# Patient Record
Sex: MALE | Race: WHITE | NOT HISPANIC OR LATINO | Employment: PART TIME | URBAN - METROPOLITAN AREA
[De-identification: names, ages, dates, MRNs, and addresses within clinical notes are randomized per-mention and may not be internally consistent; named-entity substitution may affect disease eponyms.]

---

## 2020-10-14 RX ORDER — PROCHLORPERAZINE 25 MG/1
SUPPOSITORY RECTAL
Qty: 3 EACH | Refills: 3 | Status: SHIPPED | OUTPATIENT
Start: 2020-10-14 | End: 2020-10-28 | Stop reason: SDUPTHER

## 2020-10-14 RX ORDER — PROCHLORPERAZINE 25 MG/1
1 SUPPOSITORY RECTAL TAKE AS DIRECTED
Qty: 1 EACH | Refills: 0 | Status: SHIPPED | OUTPATIENT
Start: 2020-10-14 | End: 2020-10-28 | Stop reason: SDUPTHER

## 2020-10-14 RX ORDER — PROCHLORPERAZINE 25 MG/1
1 SUPPOSITORY RECTAL
Qty: 1 DEVICE | Refills: 1 | Status: SHIPPED | OUTPATIENT
Start: 2020-10-14 | End: 2020-10-28 | Stop reason: SDUPTHER

## 2020-10-14 NOTE — TELEPHONE ENCOUNTER
PATIENT'S SPOUSE CALLED STATING THAT PATIENT 'PASSED OUT' LAST NIGHT. SHE STATES THAT THIS EPISODE LASTED FOR ABOUT 30 MINUTES. SHE STATES THAT HE HAS BEEN TRYING TO GET A JULIAN SYSTEM APPROVAL FOR ABOUT ONE YEAR AND WOULDS LIKE TO KNOW IF THIS OFICE CAN TRY AGAIN TO GET THIS APPROVAL.    CALL BACK [phone] SPOUSE DENIS

## 2020-10-14 NOTE — TELEPHONE ENCOUNTER
I think a dexcom, which alerts him to low blood sugar would be more appropriate. If they agree, I can send scripts for that to their pharmacy. I just need their pharmacy  Info entered into the computer

## 2020-10-15 NOTE — TELEPHONE ENCOUNTER
PT's insurance is not covering his Dexcom G6 Rx due to the need of Pre-authorization from PCP. He ask that someone reach out to him

## 2020-10-20 RX ORDER — PROCHLORPERAZINE 25 MG/1
1 SUPPOSITORY RECTAL
Qty: 1 EACH | Refills: 3 | Status: SHIPPED | OUTPATIENT
Start: 2020-10-20 | End: 2020-12-11 | Stop reason: ALTCHOICE

## 2020-10-20 RX ORDER — PROCHLORPERAZINE 25 MG/1
1 SUPPOSITORY RECTAL
Qty: 1 DEVICE | Refills: 0 | Status: SHIPPED | OUTPATIENT
Start: 2020-10-20 | End: 2020-12-11 | Stop reason: ALTCHOICE

## 2020-10-20 RX ORDER — PROCHLORPERAZINE 25 MG/1
SUPPOSITORY RECTAL
Qty: 3 EACH | Refills: 11 | Status: SHIPPED | OUTPATIENT
Start: 2020-10-20 | End: 2020-12-11 | Stop reason: ALTCHOICE

## 2020-10-20 NOTE — TELEPHONE ENCOUNTER
I spoke to pt.  He doesn't have a dexcom but wants one.  His last note in centricity states he is checking 4 times a day and he takes 4 shots of insulin aday.  His last vist was 6/5/20 but he doesn't have an appt scheduled.  He had a low bs recently that scared him and his wife.  Ems was called

## 2020-10-28 ENCOUNTER — TELEPHONE (OUTPATIENT)
Dept: ENDOCRINOLOGY | Facility: CLINIC | Age: 50
End: 2020-10-28

## 2020-10-28 RX ORDER — PROCHLORPERAZINE 25 MG/1
SUPPOSITORY RECTAL
Qty: 3 EACH | Refills: 5 | Status: SHIPPED | OUTPATIENT
Start: 2020-10-28 | End: 2020-12-11 | Stop reason: ALTCHOICE

## 2020-10-28 RX ORDER — PROCHLORPERAZINE 25 MG/1
1 SUPPOSITORY RECTAL
Qty: 1 DEVICE | Refills: 0 | Status: SHIPPED | OUTPATIENT
Start: 2020-10-28 | End: 2020-12-11 | Stop reason: ALTCHOICE

## 2020-10-28 RX ORDER — PROCHLORPERAZINE 25 MG/1
1 SUPPOSITORY RECTAL TAKE AS DIRECTED
Qty: 1 EACH | Refills: 3 | Status: SHIPPED | OUTPATIENT
Start: 2020-10-28 | End: 2020-12-11 | Stop reason: ALTCHOICE

## 2020-10-28 NOTE — TELEPHONE ENCOUNTER
PLEASE CALL PT HE WAS CALLING ABOUT THE PA FOR HIS DEXCOM    PLEASE CALL [phone] IF HE DOES NOT ANSWER ITS OK TO LEAVE A MESSAGE

## 2020-12-11 ENCOUNTER — OFFICE VISIT (OUTPATIENT)
Dept: ENDOCRINOLOGY | Facility: CLINIC | Age: 50
End: 2020-12-11

## 2020-12-11 VITALS
HEIGHT: 67 IN | WEIGHT: 240 LBS | DIASTOLIC BLOOD PRESSURE: 84 MMHG | SYSTOLIC BLOOD PRESSURE: 132 MMHG | BODY MASS INDEX: 37.67 KG/M2 | HEART RATE: 84 BPM

## 2020-12-11 DIAGNOSIS — I10 ESSENTIAL HYPERTENSION: ICD-10-CM

## 2020-12-11 DIAGNOSIS — E10.649 TYPE 1 DIABETES MELLITUS WITH HYPOGLYCEMIA AND WITHOUT COMA (HCC): Primary | ICD-10-CM

## 2020-12-11 PROCEDURE — 99213 OFFICE O/P EST LOW 20 MIN: CPT | Performed by: INTERNAL MEDICINE

## 2020-12-11 RX ORDER — ALBUTEROL SULFATE 1.25 MG/3ML
1 SOLUTION RESPIRATORY (INHALATION) EVERY 6 HOURS PRN
COMMUNITY

## 2020-12-11 RX ORDER — MONTELUKAST SODIUM 10 MG/1
10 TABLET ORAL DAILY
COMMUNITY
Start: 2020-10-22

## 2020-12-11 RX ORDER — MULTIPLE VITAMINS W/ MINERALS TAB 9MG-400MCG
1 TAB ORAL DAILY
COMMUNITY

## 2020-12-11 RX ORDER — INSULIN ASPART 100 [IU]/ML
INJECTION, SOLUTION INTRAVENOUS; SUBCUTANEOUS
COMMUNITY
Start: 2020-09-11 | End: 2020-12-11 | Stop reason: SDUPTHER

## 2020-12-11 RX ORDER — LANOLIN ALCOHOL/MO/W.PET/CERES
1000 CREAM (GRAM) TOPICAL DAILY
COMMUNITY

## 2020-12-11 RX ORDER — LANSOPRAZOLE 15 MG/1
15 CAPSULE, DELAYED RELEASE ORAL DAILY
COMMUNITY

## 2020-12-11 RX ORDER — ATORVASTATIN CALCIUM 40 MG/1
40 TABLET, FILM COATED ORAL DAILY
COMMUNITY

## 2020-12-11 RX ORDER — LANCETS
EACH MISCELLANEOUS
COMMUNITY
Start: 2020-09-09 | End: 2020-12-15 | Stop reason: SDUPTHER

## 2020-12-11 RX ORDER — BUDESONIDE AND FORMOTEROL FUMARATE DIHYDRATE 160; 4.5 UG/1; UG/1
2 AEROSOL RESPIRATORY (INHALATION)
COMMUNITY

## 2020-12-11 RX ORDER — INSULIN ASPART 100 [IU]/ML
INJECTION, SOLUTION INTRAVENOUS; SUBCUTANEOUS
Qty: 216 ML | Refills: 3 | Status: SHIPPED | OUTPATIENT
Start: 2020-12-11 | End: 2021-01-14 | Stop reason: SDUPTHER

## 2020-12-11 RX ORDER — LOSARTAN POTASSIUM 100 MG/1
100 TABLET ORAL EVERY MORNING
COMMUNITY
Start: 2020-10-22

## 2020-12-11 RX ORDER — BLOOD SUGAR DIAGNOSTIC
1 STRIP MISCELLANEOUS 4 TIMES DAILY
Qty: 400 EACH | Refills: 3 | Status: SHIPPED | OUTPATIENT
Start: 2020-12-11 | End: 2020-12-15 | Stop reason: SDUPTHER

## 2020-12-11 RX ORDER — HYDROCHLOROTHIAZIDE 25 MG/1
25 TABLET ORAL DAILY
COMMUNITY
Start: 2020-10-22

## 2020-12-11 RX ORDER — ASPIRIN 325 MG
325 TABLET ORAL DAILY
COMMUNITY

## 2020-12-11 RX ORDER — UBIDECARENONE 100 MG
200 CAPSULE ORAL DAILY
COMMUNITY
End: 2023-01-06

## 2020-12-11 RX ORDER — BLOOD SUGAR DIAGNOSTIC
1 STRIP MISCELLANEOUS 4 TIMES DAILY
COMMUNITY
Start: 2020-09-08 | End: 2020-12-11 | Stop reason: SDUPTHER

## 2020-12-11 NOTE — PROGRESS NOTES
"     Office Note      Date: 2020  Patient Name: Keo Kang  MRN: [id number]  : 1970    Chief Complaint   Patient presents with   • Diabetes       History of Present Illness:   Keo Kang is a 50 y.o. male who presents for Diabetes - type 1  Duration- 30 + years  Severity- easily controlled  Associated conditions- high cholesterol and high blood pressure - on meds for both.  Symptoms- none.    bg checks- 4 times per day  Hypoglycemia- occasionally .. some rather symptomatic      Last A1c:7.6  Changes in health since last visit: none . Last eye exam up to date.    Subjective            Review of Systems:   Review of Systems   Constitutional: Negative.    HENT: Negative.    Eyes: Negative.    Respiratory: Negative.    Cardiovascular: Negative.    Gastrointestinal: Negative.    Endocrine: Negative.    Genitourinary: Negative.    Musculoskeletal: Negative.    Skin: Negative.    Allergic/Immunologic: Negative.    Neurological: Negative.    Hematological: Negative.    Psychiatric/Behavioral: Negative.        The following portions of the patient's history were reviewed and updated as appropriate: allergies, current medications, past family history, past medical history, past social history, past surgical history and problem list.    Objective     Visit Vitals  /84 (BP Location: Left arm, Patient Position: Sitting, Cuff Size: Adult)   Pulse 84   Ht 170.2 cm (67\")   Wt 109 kg (240 lb)   BMI 37.59 kg/m²       Labs:    CMP  No results found for: GLUCOSE, BUN, CREATININE, EGFRIFNONA, EGFRIFAFRI, BCR, K, CO2, CALCIUM, PROTENTOTREF, LABIL2, BILIRUBIN, AST, ALT     CBC w/DIFF  No results found for: WBC, RBC, HGB, HCT, MCV, MCH, MCHC, RDW, RDWSD, MPV, PLT, NEUTRORELPCT, LYMPHORELPCT, MONORELPCT, EOSRELPCT, BASORELPCT, AUTOIGPER, NEUTROABS, LYMPHSABS, MONOSABS, EOSABS, BASOSABS, AUTOIGNUM, NRBC    Physical Exam:  Physical Exam     Assessment / Plan      Assessment & Plan:  Problem List Items Addressed " This Visit        Cardiovascular and Mediastinum    Essential hypertension    Current Assessment & Plan     bp a little high today         Relevant Medications    hydroCHLOROthiazide (HYDRODIURIL) 25 MG tablet    losartan (COZAAR) 100 MG tablet       Endocrine    Type 1 diabetes mellitus with hypoglycemia (CMS/[facility]) - Primary    Current Assessment & Plan     a1c is improved. No changes from my end          Relevant Medications    NovoLOG FlexPen 100 UNIT/ML solution pen-injector sc pen    Insulin Degludec (TRESIBA FLEXTOUCH) 200 UNIT/ML solution pen-injector pen injection           Dante Lou MD   12/11/2020

## 2020-12-15 DIAGNOSIS — E10.649 TYPE 1 DIABETES MELLITUS WITH HYPOGLYCEMIA AND WITHOUT COMA (HCC): ICD-10-CM

## 2020-12-15 RX ORDER — BLOOD SUGAR DIAGNOSTIC
1 STRIP MISCELLANEOUS 4 TIMES DAILY
Qty: 400 EACH | Refills: 3 | Status: SHIPPED | OUTPATIENT
Start: 2020-12-15 | End: 2021-10-05

## 2020-12-15 RX ORDER — LANCETS
EACH MISCELLANEOUS
Qty: 400 EACH | Refills: 3 | Status: SHIPPED | OUTPATIENT
Start: 2020-12-15 | End: 2021-10-04

## 2021-01-14 DIAGNOSIS — E10.649 TYPE 1 DIABETES MELLITUS WITH HYPOGLYCEMIA AND WITHOUT COMA (HCC): ICD-10-CM

## 2021-01-14 RX ORDER — PEN NEEDLE, DIABETIC 30 GX3/16"
1 NEEDLE, DISPOSABLE MISCELLANEOUS
Qty: 540 EACH | Refills: 1 | Status: SHIPPED | OUTPATIENT
Start: 2021-01-14

## 2021-01-14 RX ORDER — INSULIN ASPART 100 [IU]/ML
INJECTION, SOLUTION INTRAVENOUS; SUBCUTANEOUS
Qty: 216 ML | Refills: 3 | Status: SHIPPED | OUTPATIENT
Start: 2021-01-14 | End: 2022-01-19

## 2021-01-14 NOTE — TELEPHONE ENCOUNTER
Spoke with patient.  Requesting refill through [facility]]a.

## 2021-01-14 NOTE — TELEPHONE ENCOUNTER
Pt called inquiring if his medications Nuvolog and pen needles have been accepted for [facility] online [facility]. CB number is [phone] or call home phone.

## 2021-01-21 ENCOUNTER — TELEPHONE (OUTPATIENT)
Dept: ENDOCRINOLOGY | Facility: CLINIC | Age: 51
End: 2021-01-21

## 2021-05-24 NOTE — TELEPHONE ENCOUNTER
Message from Plan  PA Case: [id number], Status: Approved, Coverage Starts on: 1/1/2021 12:00:00 AM, Coverage Ends on: 12/31/2021   no

## 2021-06-11 ENCOUNTER — OFFICE VISIT (OUTPATIENT)
Dept: ENDOCRINOLOGY | Facility: CLINIC | Age: 51
End: 2021-06-11

## 2021-06-11 VITALS
BODY MASS INDEX: 36.88 KG/M2 | WEIGHT: 235 LBS | HEIGHT: 67 IN | HEART RATE: 70 BPM | SYSTOLIC BLOOD PRESSURE: 128 MMHG | DIASTOLIC BLOOD PRESSURE: 80 MMHG | OXYGEN SATURATION: 98 %

## 2021-06-11 DIAGNOSIS — IMO0002 DIABETES MELLITUS TYPE 1, UNCONTROLLED, WITH COMPLICATIONS: Primary | ICD-10-CM

## 2021-06-11 DIAGNOSIS — E10.649 TYPE 1 DIABETES MELLITUS WITH HYPOGLYCEMIA AND WITHOUT COMA (HCC): ICD-10-CM

## 2021-06-11 LAB
EXPIRATION DATE: NORMAL
GLUCOSE BLDC GLUCOMTR-MCNC: 97 MG/DL (ref 70–130)
Lab: NORMAL

## 2021-06-11 PROCEDURE — 99213 OFFICE O/P EST LOW 20 MIN: CPT | Performed by: INTERNAL MEDICINE

## 2021-06-11 PROCEDURE — 82947 ASSAY GLUCOSE BLOOD QUANT: CPT | Performed by: INTERNAL MEDICINE

## 2021-06-11 NOTE — PROGRESS NOTES
"     Office Note      Date: 2021  Patient Name: Keo Kang  MRN: [id number]  : 1970    Chief Complaint   Patient presents with   • Diabetes       History of Present Illness:   Keo Kang is a 50 y.o. male who presents for Diabetes- type 1  He is on tresiba and novolog.  bg checks are done 4 times per day  Hypoglycemia- weekly.. as low as 23. Wife had to give glucagon but that's a rarity       Last A1c:7.5      Changes in health since last visit: none / has not had his covid vaccine . Last eye exam   Full labs are up to date  Foot exam was done in may.    Subjective        Review of Systems:   Review of Systems   Constitutional: Negative.    HENT: Negative.    Eyes: Negative.    Respiratory: Negative.    All other systems reviewed and are negative.      The following portions of the patient's history were reviewed and updated as appropriate: allergies, current medications, past family history, past medical history, past social history, past surgical history and problem list.    Objective     Visit Vitals  /80 (BP Location: Left arm, Patient Position: Sitting, Cuff Size: Adult)   Pulse 70   Ht 170.2 cm (67\")   Wt 107 kg (235 lb)   SpO2 98%   BMI 36.81 kg/m²       Labs:        Physical Exam:  Physical Exam  Vitals reviewed.   Constitutional:       Appearance: Normal appearance.   Neurological:      Mental Status: He is alert.   Psychiatric:         Mood and Affect: Mood normal.         Thought Content: Thought content normal.         Judgment: Judgment normal.          Assessment / Plan      Assessment & Plan:  Problem List Items Addressed This Visit        Other    Type 1 diabetes mellitus with hypoglycemia (CMS/[facility])    Current Assessment & Plan     Diabetes is unchanged.   Continue current treatment regimen.  Diabetes will be reassessed in 6 months.         Relevant Medications    Accu-Chek SmartView test strip    NovoLOG FlexPen 100 UNIT/ML solution pen-injector sc pen    Insulin " Degludec (TRESIBA FLEXTOUCH) 200 UNIT/ML solution pen-injector pen injection      Other Visit Diagnoses     Diabetes mellitus type 1, uncontrolled, with complications (CMS/[facility])    -  Primary    Relevant Orders    POC Glucose, Blood           Dante Lou MD   06/11/2021

## 2021-09-07 RX ORDER — INSULIN DEGLUDEC 200 U/ML
INJECTION, SOLUTION SUBCUTANEOUS
Qty: 45 ML | Refills: 1 | Status: SHIPPED | OUTPATIENT
Start: 2021-09-07 | End: 2022-05-12

## 2021-10-04 RX ORDER — LANCETS
EACH MISCELLANEOUS
Qty: 408 EACH | Refills: 1 | Status: SHIPPED | OUTPATIENT
Start: 2021-10-04 | End: 2022-03-02

## 2021-10-05 DIAGNOSIS — E10.649 TYPE 1 DIABETES MELLITUS WITH HYPOGLYCEMIA AND WITHOUT COMA (HCC): ICD-10-CM

## 2021-10-05 RX ORDER — BLOOD SUGAR DIAGNOSTIC
STRIP MISCELLANEOUS
Qty: 400 EACH | Refills: 1 | Status: SHIPPED | OUTPATIENT
Start: 2021-10-05 | End: 2022-03-09 | Stop reason: SDUPTHER

## 2021-12-17 ENCOUNTER — OFFICE VISIT (OUTPATIENT)
Dept: ENDOCRINOLOGY | Facility: CLINIC | Age: 51
End: 2021-12-17

## 2021-12-17 VITALS
BODY MASS INDEX: 36.73 KG/M2 | DIASTOLIC BLOOD PRESSURE: 72 MMHG | HEART RATE: 82 BPM | HEIGHT: 67 IN | WEIGHT: 234 LBS | OXYGEN SATURATION: 96 % | SYSTOLIC BLOOD PRESSURE: 110 MMHG

## 2021-12-17 DIAGNOSIS — E10.649 TYPE 1 DIABETES MELLITUS WITH HYPOGLYCEMIA AND WITHOUT COMA (HCC): Primary | ICD-10-CM

## 2021-12-17 LAB — HBA1C MFR BLD: 7.8 %

## 2021-12-17 PROCEDURE — 99213 OFFICE O/P EST LOW 20 MIN: CPT | Performed by: INTERNAL MEDICINE

## 2021-12-17 RX ORDER — METHOCARBAMOL 500 MG/1
500 TABLET, FILM COATED ORAL AS NEEDED
COMMUNITY

## 2022-01-19 ENCOUNTER — TELEPHONE (OUTPATIENT)
Dept: ENDOCRINOLOGY | Facility: CLINIC | Age: 52
End: 2022-01-19

## 2022-01-19 RX ORDER — INSULIN LISPRO 100 [IU]/ML
INJECTION, SOLUTION INTRAVENOUS; SUBCUTANEOUS
Qty: 72 ML | Refills: 5 | Status: SHIPPED | OUTPATIENT
Start: 2022-01-19 | End: 2022-06-20 | Stop reason: SDUPTHER

## 2022-01-19 NOTE — TELEPHONE ENCOUNTER
PT CALLED STATING NOVOLOG REQUIRES A PA AGAIN. PT REQUESTED WE LOOK INTO THIS AND REACH OUT HIM. THANK YOU

## 2022-01-20 DIAGNOSIS — E10.649 TYPE 1 DIABETES MELLITUS WITH HYPOGLYCEMIA AND WITHOUT COMA: ICD-10-CM

## 2022-01-20 RX ORDER — INSULIN ASPART 100 [IU]/ML
INJECTION, SOLUTION INTRAVENOUS; SUBCUTANEOUS
Qty: 210 ML | Refills: 5 | Status: SHIPPED | OUTPATIENT
Start: 2022-01-20 | End: 2023-01-25 | Stop reason: SDUPTHER

## 2022-01-21 ENCOUNTER — TELEPHONE (OUTPATIENT)
Dept: ENDOCRINOLOGY | Facility: CLINIC | Age: 52
End: 2022-01-21

## 2022-01-21 NOTE — TELEPHONE ENCOUNTER
PLEASE CALL PT REGARDING HIS PA FOR NOVOLOG    HE SPOKE WITH HIS INSURANCE COMPANY AND THEY TOLD HIM TO JUST GET IT APPROVED     PLEASE CALL  [phone] OR [phone]

## 2022-03-02 RX ORDER — LANCETS
EACH MISCELLANEOUS
Qty: 408 EACH | Refills: 3 | Status: SHIPPED | OUTPATIENT
Start: 2022-03-02 | End: 2022-10-17

## 2022-03-09 DIAGNOSIS — E10.649 TYPE 1 DIABETES MELLITUS WITH HYPOGLYCEMIA AND WITHOUT COMA: ICD-10-CM

## 2022-03-09 RX ORDER — BLOOD SUGAR DIAGNOSTIC
STRIP MISCELLANEOUS
Qty: 400 EACH | Refills: 1 | Status: SHIPPED | OUTPATIENT
Start: 2022-03-09 | End: 2022-08-03

## 2022-05-12 RX ORDER — INSULIN DEGLUDEC 200 U/ML
INJECTION, SOLUTION SUBCUTANEOUS
Qty: 45 ML | Refills: 0 | Status: SHIPPED | OUTPATIENT
Start: 2022-05-12 | End: 2023-02-09

## 2022-06-20 ENCOUNTER — OFFICE VISIT (OUTPATIENT)
Dept: ENDOCRINOLOGY | Facility: CLINIC | Age: 52
End: 2022-06-20

## 2022-06-20 VITALS
WEIGHT: 228 LBS | HEART RATE: 57 BPM | HEIGHT: 67 IN | OXYGEN SATURATION: 98 % | BODY MASS INDEX: 35.79 KG/M2 | SYSTOLIC BLOOD PRESSURE: 120 MMHG | DIASTOLIC BLOOD PRESSURE: 74 MMHG

## 2022-06-20 DIAGNOSIS — I10 ESSENTIAL HYPERTENSION: ICD-10-CM

## 2022-06-20 DIAGNOSIS — E78.2 MIXED HYPERLIPIDEMIA: ICD-10-CM

## 2022-06-20 DIAGNOSIS — E10.649 TYPE 1 DIABETES MELLITUS WITH HYPOGLYCEMIA AND WITHOUT COMA: Primary | ICD-10-CM

## 2022-06-20 PROCEDURE — 82570 ASSAY OF URINE CREATININE: CPT | Performed by: PHYSICIAN ASSISTANT

## 2022-06-20 PROCEDURE — 99214 OFFICE O/P EST MOD 30 MIN: CPT | Performed by: PHYSICIAN ASSISTANT

## 2022-06-20 PROCEDURE — 82043 UR ALBUMIN QUANTITATIVE: CPT | Performed by: PHYSICIAN ASSISTANT

## 2022-06-20 NOTE — PROGRESS NOTES
Office Note      Date: 2022  Patient Name: Keo Kang  MRN: [id number]  : 1970    Chief Complaint   Patient presents with   • Diabetes       History of Present Illness:   Keo Kang is a 51 y.o. male who presents for 6 month follow-up for type 1 diabetes.  He had labs completed with his primary care physician the end of last month.  His hemoglobin A1c was 7.3%.  He remains on Tresiba up to 98 units daily but recently has been taking 84 to 88 units because he had some issues with severe hypoglycemia about 6 weeks ago.  He reports his blood glucose dropped into the 20s and his wife had trouble helping him to get this back up but EMS was not called.  He reports he reduced the insulin after the incident and has not had any more hypoglycemia that severe.  He reports his primary care physician gave him a sample of the freestyle wei 2 to try.  Dr. Lou has suggested continuous glucose monitoring in the past but this was too costly.  He checks his blood sugar 4 times a day.  Patient reports he is interested in considering this again.  He has lost weight since his appointment in December.  He reports he and his wife have been trying to eat healthier in an effort to lose weight.  He reports he went for his eye exam last Monday and all was okay.  He denies any trouble with his feet today Dr. Lou did his foot exam at his appointment in December.      Subjective     Review of Systems:   Review of Systems   Constitutional: Negative.    Cardiovascular: Negative.    Gastrointestinal: Negative.    Endocrine: Negative.    Neurological: Negative.        The following portions of the patient's history were reviewed and updated as appropriate: allergies, current medications, past family history, past medical history, past social history, past surgical history and problem list.    Objective     Vitals:    22 0847   BP: 120/74   Pulse: 57   SpO2: 98%   Weight: 103 kg (228 lb)   Height: 170.2 cm  "(67\")   PainSc:   4     Body mass index is 35.71 kg/m².    Physical Exam  Vitals reviewed.   Constitutional:       General: He is not in acute distress.     Appearance: Normal appearance.   Neurological:      Mental Status: He is alert.           Assessment / Plan      Assessment & Plan:  1. Type 1 diabetes mellitus with hypoglycemia and without coma (HCC)  I reviewed his lab results from May 27 of this year.  His hemoglobin A1c was 7.3%.  This is improved as compared to December when this was checked but is still above goal.  He has had some trouble with hypoglycemia about 6 weeks ago.  He reduced his Tresiba and has not had any severe hypoglycemia since.  For now he will continue 84 units of Tresiba daily and NovoLog with meals based on his blood glucose reading and carbohydrate intake.  I think you would benefit from a continuous glucose monitor.  His primary care physician sampled him the freestyle wei 2.  I sent a prescription for the sensors to [facility] pharmacy to see if this will be covered and is may be more affordable than it was in the past.  Previously the Dexcom was covered but too costly and was sent through [facility].  I encouraged patient to reach out if we need to send the sensor prescription elsewhere.  His weight is down 6 pounds since his appointment in December.  I encouraged continued healthy eating habits and physical activity as tolerated.  He is due for his urine microalbumin/creatinine ratio.  We will do this today.  Will send note with results and plan.  - Microalbumin / Creatinine Urine Ratio - Urine, Clean Catch; Future  - Microalbumin / Creatinine Urine Ratio - Urine, Clean Catch    2. Essential hypertension  His blood pressure is okay today.  He will continue his current medications.    3. Mixed hyperlipidemia  His fasting lipid panel was to goal last month.  I reviewed these results.  He will continue atorvastatin 40 mg daily.  Patient to call as needed.      Return in about 6 " months (around 12/20/2022) for sees Dr. Dante Lou every 6 mos.     This note was dictated using Dragon voice recognition.    Madeline Kaba PA-C  06/20/2022

## 2022-06-21 LAB
ALBUMIN/CREAT UR: <9 MG/G CREAT (ref 0–29)
CREAT UR-MCNC: 34 MG/DL
MICROALBUMIN UR-MCNC: <3 UG/ML

## 2022-06-29 ENCOUNTER — TELEPHONE (OUTPATIENT)
Dept: ENDOCRINOLOGY | Facility: CLINIC | Age: 52
End: 2022-06-29

## 2022-06-29 NOTE — TELEPHONE ENCOUNTER
Pt called he's following up Jean 2 sensors?  Waiting on the PA from the 21st to process?  Please contact.  Last time they got it to approve it had Durable [employer]........Thank you

## 2022-06-29 NOTE — TELEPHONE ENCOUNTER
Spoke with patient.  He is going to contact insurance and find out if this needs to go through pharmacy or DME and call the office back.

## 2022-06-29 NOTE — TELEPHONE ENCOUNTER
PT CALLED STATING HE CXLED HIS ORDER FROM PHARM. HE WAS UNSURE IF THEY WOULD STILL SEND DENIAL. HE REQUESTED WE LOOK INTO THIS OR SEND RX IN TO [facility] AND CONTACT PT.

## 2022-07-19 ENCOUNTER — TELEPHONE (OUTPATIENT)
Dept: ENDOCRINOLOGY | Facility: CLINIC | Age: 52
End: 2022-07-19

## 2022-08-03 DIAGNOSIS — E10.649 TYPE 1 DIABETES MELLITUS WITH HYPOGLYCEMIA AND WITHOUT COMA: ICD-10-CM

## 2022-08-03 RX ORDER — BLOOD SUGAR DIAGNOSTIC
STRIP MISCELLANEOUS
Qty: 400 EACH | Refills: 1 | Status: SHIPPED | OUTPATIENT
Start: 2022-08-03 | End: 2023-03-13

## 2022-10-17 RX ORDER — LANCETS
EACH MISCELLANEOUS
Qty: 400 EACH | Refills: 3 | Status: SHIPPED | OUTPATIENT
Start: 2022-10-17

## 2023-01-06 ENCOUNTER — OFFICE VISIT (OUTPATIENT)
Dept: ENDOCRINOLOGY | Facility: CLINIC | Age: 53
End: 2023-01-06
Payer: MEDICARE

## 2023-01-06 VITALS
WEIGHT: 203 LBS | HEART RATE: 60 BPM | SYSTOLIC BLOOD PRESSURE: 114 MMHG | BODY MASS INDEX: 31.86 KG/M2 | HEIGHT: 67 IN | OXYGEN SATURATION: 97 % | DIASTOLIC BLOOD PRESSURE: 73 MMHG

## 2023-01-06 DIAGNOSIS — E10.649 TYPE 1 DIABETES MELLITUS WITH HYPOGLYCEMIA AND WITHOUT COMA: Primary | ICD-10-CM

## 2023-01-06 LAB
EXPIRATION DATE: ABNORMAL
GLUCOSE BLDC GLUCOMTR-MCNC: 106 MG/DL (ref 70–130)
Lab: ABNORMAL

## 2023-01-06 PROCEDURE — 82947 ASSAY GLUCOSE BLOOD QUANT: CPT | Performed by: INTERNAL MEDICINE

## 2023-01-06 PROCEDURE — 99213 OFFICE O/P EST LOW 20 MIN: CPT | Performed by: INTERNAL MEDICINE

## 2023-01-06 NOTE — PROGRESS NOTES
Office Note      Date: 2023  Patient Name: Keo Kang  MRN: [id number]  : 1970    Chief Complaint   Patient presents with   • Diabetes       History of Present Illness:   Keo Kang is a 52 y.o. male who presents for Diabetes type 1.   Current RX  4 shots per day     Bg checks are done:>10 times per day with wei   Hypoglycemia :occasional       Last A1c:7.4 in December with FPA  Hemoglobin A1C   Date Value Ref Range Status   2021 7.8  Final       Changes in health since last visit: had full labs in December with pcp . Last eye exam  .    Subjective              Review of Systems:   Review of Systems   Constitutional: Negative.    HENT: Negative.    Eyes: Negative.    Respiratory: Negative.        The following portions of the patient's history were reviewed and updated as appropriate: allergies, current medications, past family history, past medical history, past social history, past surgical history and problem list.    Objective     Visit Vitals  /73   Pulse 60   Ht 170.2 cm (67\")   Wt 92.1 kg (203 lb)   SpO2 97%   BMI 31.79 kg/m²           Physical Exam:  Physical Exam  Vitals reviewed.   Constitutional:       Appearance: Normal appearance. He is normal weight.   Cardiovascular:      Pulses:           Dorsalis pedis pulses are 2+ on the right side and 2+ on the left side.        Posterior tibial pulses are 2+ on the right side and 2+ on the left side.   Musculoskeletal:      Right foot: Normal range of motion. No deformity, bunion, Charcot foot, foot drop or prominent metatarsal heads.      Left foot: Normal range of motion. No deformity, Charcot foot, foot drop or prominent metatarsal heads.   Feet:      Right foot:      Protective Sensation: 10 sites tested. 10 sites sensed.      Skin integrity: Skin integrity normal.      Toenail Condition: Right toenails are normal.      Left foot:      Protective Sensation: 10 sites tested. 10 sites sensed.      Skin integrity:  Skin integrity normal.      Toenail Condition: Left toenails are normal.      Comments: Diabetic Foot Exam Performed and Monofilament Test Performed    Neurological:      Mental Status: He is alert.   Psychiatric:         Mood and Affect: Mood normal.         Thought Content: Thought content normal.         Judgment: Judgment normal.          Assessment / Plan      Assessment & Plan:  Problem List Items Addressed This Visit        Other    Type 1 diabetes mellitus with hypoglycemia (HCC) - Primary    Current Assessment & Plan      Stable  a1c reasonable at 7.4  No changes are needed          Relevant Medications    NovoLOG FlexPen 100 UNIT/ML solution pen-injector sc pen    Tresiba FlexTouch 200 UNIT/ML solution pen-injector pen injection    Accu-Chek SmartView test strip    Other Relevant Orders    POC Glucose, Blood (Completed)        Dante Lou MD   01/06/2023

## 2023-01-20 ENCOUNTER — PRIOR AUTHORIZATION (OUTPATIENT)
Dept: ENDOCRINOLOGY | Facility: CLINIC | Age: 53
End: 2023-01-20
Payer: MEDICARE

## 2023-01-20 ENCOUNTER — TELEPHONE (OUTPATIENT)
Dept: ENDOCRINOLOGY | Facility: CLINIC | Age: 53
End: 2023-01-20

## 2023-01-20 NOTE — TELEPHONE ENCOUNTER
Patient needs a PA for his insulin sent to his insurance company. He said that the insurance company faxed us one on 1/12/23 but had not received a response. Please send to [facility].

## 2023-01-20 NOTE — TELEPHONE ENCOUNTER
Keo Kang Key: [id number] - PA Case ID: [id number] - Rx #: [id number] help? Call us at [phone]  Outcome  Approvedtoday  PA Case: [id number], Status: Approved, Coverage Starts on: 1/1/2023 12:00:00 AM, Coverage Ends on: 12/31/2023 12:00:00 AM. Questions? Contact [phone].  Drug  NovoLOG FlexPen 100UNIT/ML pen-injectors  Form  [employer] PA Form

## 2023-01-24 DIAGNOSIS — E10.649 TYPE 1 DIABETES MELLITUS WITH HYPOGLYCEMIA AND WITHOUT COMA: ICD-10-CM

## 2023-01-24 RX ORDER — INSULIN ASPART 100 [IU]/ML
INJECTION, SOLUTION INTRAVENOUS; SUBCUTANEOUS
Qty: 210 ML | Refills: 5 | Status: CANCELLED | OUTPATIENT
Start: 2023-01-24

## 2023-01-25 RX ORDER — INSULIN ASPART 100 [IU]/ML
INJECTION, SOLUTION INTRAVENOUS; SUBCUTANEOUS
Qty: 216 ML | Refills: 1 | Status: SHIPPED | OUTPATIENT
Start: 2023-01-25

## 2023-02-09 RX ORDER — INSULIN DEGLUDEC 200 U/ML
INJECTION, SOLUTION SUBCUTANEOUS
Qty: 45 ML | Refills: 0 | Status: SHIPPED | OUTPATIENT
Start: 2023-02-09

## 2023-02-09 NOTE — TELEPHONE ENCOUNTER
Nov 7/7/23  lov 1/6/23    On 1/6/23 under assessment and plan under relevant medications tresiba flextouch 200 unit/ml is listed

## 2023-03-13 DIAGNOSIS — E10.649 TYPE 1 DIABETES MELLITUS WITH HYPOGLYCEMIA AND WITHOUT COMA: ICD-10-CM

## 2023-03-13 RX ORDER — BLOOD SUGAR DIAGNOSTIC
STRIP MISCELLANEOUS
Qty: 200 EACH | Refills: 3 | Status: SHIPPED | OUTPATIENT
Start: 2023-03-13

## 2023-09-27 ENCOUNTER — TELEPHONE (OUTPATIENT)
Dept: ENDOCRINOLOGY | Facility: CLINIC | Age: 53
End: 2023-09-27

## 2023-09-27 NOTE — TELEPHONE ENCOUNTER
Caller: Keo Kang    Relationship: Self    Best call back number: [phone]    What form or medical record are you requesting: G PAPERWORK    Who is requesting this form or medical record from you: PATIENT -[facility]    How would you like to receive the form or medical records (pick-up, mail, fax):    If fax, what is the fax number: [phone]    Timeframe paperwork needed: ASAP    Additional notes: PATIENT STATES THAT [facility] HAS FAXED OVER HIS [employer] PAPERWORK TO BE FILLED OUT AND FAXED BACK TO THEM. PATIENT WOULD LIKE TO KNOW IF THE OFFICE HAS REC'D THE PAPERS AND IF NOT PLEASE CONTACT HIM SO HE CAN CALL THEM TO MAKE SURE THEY GET FAXED.

## 2023-12-21 ENCOUNTER — OFFICE VISIT (OUTPATIENT)
Dept: ENDOCRINOLOGY | Facility: CLINIC | Age: 53
End: 2023-12-21
Payer: MEDICARE

## 2023-12-21 VITALS
DIASTOLIC BLOOD PRESSURE: 74 MMHG | HEIGHT: 67 IN | HEART RATE: 71 BPM | WEIGHT: 200 LBS | BODY MASS INDEX: 31.39 KG/M2 | SYSTOLIC BLOOD PRESSURE: 112 MMHG | OXYGEN SATURATION: 98 %

## 2023-12-21 DIAGNOSIS — E10.649 TYPE 1 DIABETES MELLITUS WITH HYPOGLYCEMIA AND WITHOUT COMA: Primary | ICD-10-CM

## 2023-12-21 DIAGNOSIS — E10.649 TYPE 1 DIABETES MELLITUS WITH HYPOGLYCEMIA AND WITHOUT COMA: ICD-10-CM

## 2023-12-21 LAB — HBA1C MFR BLD: 7.3 % (ref 4.5–5.7)

## 2023-12-21 PROCEDURE — 3078F DIAST BP <80 MM HG: CPT | Performed by: INTERNAL MEDICINE

## 2023-12-21 PROCEDURE — 1160F RVW MEDS BY RX/DR IN RCRD: CPT | Performed by: INTERNAL MEDICINE

## 2023-12-21 PROCEDURE — 3051F HG A1C>EQUAL 7.0%<8.0%: CPT | Performed by: INTERNAL MEDICINE

## 2023-12-21 PROCEDURE — 99213 OFFICE O/P EST LOW 20 MIN: CPT | Performed by: INTERNAL MEDICINE

## 2023-12-21 PROCEDURE — 3074F SYST BP LT 130 MM HG: CPT | Performed by: INTERNAL MEDICINE

## 2023-12-21 PROCEDURE — 1159F MED LIST DOCD IN RCRD: CPT | Performed by: INTERNAL MEDICINE

## 2023-12-21 RX ORDER — LANCETS
EACH MISCELLANEOUS
Qty: 400 EACH | Refills: 3 | Status: SHIPPED | OUTPATIENT
Start: 2023-12-21

## 2023-12-21 RX ORDER — BLOOD SUGAR DIAGNOSTIC
STRIP MISCELLANEOUS
Qty: 200 EACH | Refills: 3 | Status: SHIPPED | OUTPATIENT
Start: 2023-12-21

## 2023-12-21 NOTE — PROGRESS NOTES
"     Office Note      Date: 2023  Patient Name: Keo Kang  MRN: [id number]  : 1970    Chief Complaint   Patient presents with    Diabetes       History of Present Illness:   Keo Kang is a 53 y.o. male who presents for Diabetes type 1.   Current RX novolog and tresiba     Bg checks are done:>10 times per day with wei   Hypoglycemia :rare       Last A1c:  Hemoglobin A1C   Date Value Ref Range Status   2023 7.3 (A) 4.5 - 5.7 % Final   2023 7.5  Final       Changes in health since last visit: none . Last eye exam up to date  He is up to date with his lipids and foot check and mik .    Subjective              Review of Systems:   Review of Systems   Constitutional: Negative.    HENT: Negative.     Eyes: Negative.    Respiratory: Negative.         The following portions of the patient's history were reviewed and updated as appropriate: allergies, current medications, past family history, past medical history, past social history, past surgical history, and problem list.    Objective     Visit Vitals  /74   Pulse 71   Ht 170.2 cm (67\")   Wt 90.7 kg (200 lb)   SpO2 98%   BMI 31.32 kg/m²           Physical Exam:  Physical Exam  Vitals reviewed.   Constitutional:       Appearance: Normal appearance.   Neurological:      Mental Status: He is alert.   Psychiatric:         Mood and Affect: Mood normal.         Behavior: Behavior normal.         Thought Content: Thought content normal.         Judgment: Judgment normal.          Assessment / Plan      Assessment & Plan:  Problem List Items Addressed This Visit          Other    Type 1 diabetes mellitus with hypoglycemia - Primary    Overview     A1c at goal  No changes are needed          Current Assessment & Plan      A1c 7.3 is acceptable. No changes are needed         Relevant Medications    NovoLOG FlexPen 100 UNIT/ML solution pen-injector sc pen    Tresiba FlexTouch 200 UNIT/ML solution pen-injector pen injection    Accu-Chek " SmartView test strip         Electronically signed by :Dante Lou MD   12/21/2023

## 2024-02-14 DIAGNOSIS — E10.649 TYPE 1 DIABETES MELLITUS WITH HYPOGLYCEMIA AND WITHOUT COMA: ICD-10-CM

## 2024-02-15 RX ORDER — INSULIN DEGLUDEC 200 U/ML
INJECTION, SOLUTION SUBCUTANEOUS
Qty: 45 ML | Refills: 3 | Status: SHIPPED | OUTPATIENT
Start: 2024-02-15

## 2024-02-15 RX ORDER — INSULIN ASPART 100 [IU]/ML
INJECTION, SOLUTION INTRAVENOUS; SUBCUTANEOUS
Qty: 210 ML | Refills: 3 | Status: SHIPPED | OUTPATIENT
Start: 2024-02-15

## 2024-02-20 ENCOUNTER — PRIOR AUTHORIZATION (OUTPATIENT)
Dept: ENDOCRINOLOGY | Facility: CLINIC | Age: 54
End: 2024-02-20
Payer: MEDICARE

## 2024-02-21 NOTE — TELEPHONE ENCOUNTER
Approvedon February 20  PA Case: [id number], Status: Approved, Coverage Starts on: 1/1/2024 12:00:00 AM, Coverage Ends on: 12/31/2024 12:00:00 AM. Questions? Contact [phone].  Drug  NovoLOG FlexPen 100UNIT/ML pen-injectors  Form  [employer] PA Form

## 2024-06-24 ENCOUNTER — OFFICE VISIT (OUTPATIENT)
Age: 54
End: 2024-06-24
Payer: MEDICARE

## 2024-06-24 VITALS
WEIGHT: 204 LBS | OXYGEN SATURATION: 98 % | HEIGHT: 67 IN | SYSTOLIC BLOOD PRESSURE: 118 MMHG | HEART RATE: 72 BPM | DIASTOLIC BLOOD PRESSURE: 68 MMHG | BODY MASS INDEX: 32.02 KG/M2

## 2024-06-24 DIAGNOSIS — E10.649 TYPE 1 DIABETES MELLITUS WITH HYPOGLYCEMIA AND WITHOUT COMA: Primary | ICD-10-CM

## 2024-06-24 LAB
EXPIRATION DATE: ABNORMAL
GLUCOSE BLDC GLUCOMTR-MCNC: 314 MG/DL (ref 70–130)
HBA1C MFR BLD: 7.5 % (ref 4.8–5.9)
Lab: ABNORMAL

## 2024-06-24 PROCEDURE — 99213 OFFICE O/P EST LOW 20 MIN: CPT | Performed by: INTERNAL MEDICINE

## 2024-06-24 PROCEDURE — 3078F DIAST BP <80 MM HG: CPT | Performed by: INTERNAL MEDICINE

## 2024-06-24 PROCEDURE — 1159F MED LIST DOCD IN RCRD: CPT | Performed by: INTERNAL MEDICINE

## 2024-06-24 PROCEDURE — 3074F SYST BP LT 130 MM HG: CPT | Performed by: INTERNAL MEDICINE

## 2024-06-24 PROCEDURE — 1160F RVW MEDS BY RX/DR IN RCRD: CPT | Performed by: INTERNAL MEDICINE

## 2024-06-24 PROCEDURE — 82947 ASSAY GLUCOSE BLOOD QUANT: CPT | Performed by: INTERNAL MEDICINE

## 2024-06-24 PROCEDURE — 3051F HG A1C>EQUAL 7.0%<8.0%: CPT | Performed by: INTERNAL MEDICINE

## 2024-06-24 RX ORDER — ACYCLOVIR 400 MG/1
1 TABLET ORAL
COMMUNITY

## 2024-06-24 NOTE — PROGRESS NOTES
"     Office Note      Date: 2024  Patient Name: Keo Kang  MRN: [id number]  : 1970    Chief Complaint   Patient presents with    Diabetes       History of Present Illness:   Keo Kang is a 53 y.o. male who presents for Diabetes type 1.   Current RX BBI     Bg checks are done:with wei - has not picked up his dexcom yet    Hypoglycemia :none       Last A1c:  Hemoglobin A1C   Date Value Ref Range Status   2024 7.5 (A) 4.8 - 5.9 % Final   2023 7.3 (A) 4.5 - 5.7 % Final   2023 7.5  Final       Changes in health since last visit: rare . Last eye exam upcoming in July  Had all his labs done last month. Ldl and mik were good and A1c was 7.5  .    Subjective            Review of Systems:   Review of Systems   Endocrine: Negative for polydipsia and polyuria.     The following portions of the patient's history were reviewed and updated as appropriate: allergies, current medications, past family history, past medical history, past social history, past surgical history, and problem list.    Objective     Visit Vitals  /68 (BP Location: Left arm, Patient Position: Sitting, Cuff Size: Adult)   Pulse 72   Ht 170.2 cm (67\")   Wt 92.5 kg (204 lb)   SpO2 98%   BMI 31.95 kg/m²           Physical Exam:  Physical Exam  Vitals reviewed.   Constitutional:       Appearance: Normal appearance. He is normal weight.   Cardiovascular:      Pulses:           Dorsalis pedis pulses are 2+ on the right side and 2+ on the left side.        Posterior tibial pulses are 2+ on the right side and 2+ on the left side.   Musculoskeletal:      Right foot: Normal range of motion. No deformity, bunion, Charcot foot, foot drop or prominent metatarsal heads.      Left foot: Normal range of motion. No deformity, bunion, Charcot foot, foot drop or prominent metatarsal heads.   Feet:      Right foot:      Protective Sensation: 10 sites tested.  10 sites sensed.      Skin integrity: Skin integrity normal.      " Toenail Condition: Right toenails are normal.      Left foot:      Protective Sensation: 10 sites tested.  10 sites sensed.      Skin integrity: Skin integrity normal.      Toenail Condition: Left toenails are normal.      Comments: Diabetic Foot Exam Performed    Neurological:      Mental Status: He is alert.   Psychiatric:         Mood and Affect: Mood normal.         Behavior: Behavior normal.         Thought Content: Thought content normal.         Judgment: Judgment normal.        Assessment / Plan      Assessment & Plan:  Problem List Items Addressed This Visit       Type 1 diabetes mellitus with hypoglycemia - Primary    Overview     A1c at goal  No changes are needed          Relevant Medications    Accu-Chek SmartView test strip    Tresiba FlexTouch 200 UNIT/ML solution pen-injector pen injection    NovoLOG FlexPen 100 UNIT/ML solution pen-injector sc pen    Other Relevant Orders    POC Glucose, Blood (Completed)         Electronically signed by : Dante Lou MD  06/24/2024

## 2024-12-08 DIAGNOSIS — E10.649 TYPE 1 DIABETES MELLITUS WITH HYPOGLYCEMIA AND WITHOUT COMA: ICD-10-CM

## 2024-12-09 RX ORDER — BLOOD SUGAR DIAGNOSTIC
STRIP MISCELLANEOUS
Qty: 200 EACH | Refills: 0 | Status: SHIPPED | OUTPATIENT
Start: 2024-12-09

## 2024-12-09 NOTE — TELEPHONE ENCOUNTER
Rx Refill Note  Requested Prescriptions     Pending Prescriptions Disp Refills    Accu-Chek SmartView test strip [Pharmacy Med Name: Accu-Chek SmartView In Vitro Strip]  3     Sig: USE TO TEST BLOOD SUGAR 4 TIMES DAILY      Last office visit with prescribing clinician: 6/24/2024      Next office visit with prescribing clinician: 12/31/2024       Bonita Larson MA  12/09/24, 11:25 EST

## 2024-12-31 ENCOUNTER — OFFICE VISIT (OUTPATIENT)
Age: 54
End: 2024-12-31
Payer: MEDICARE

## 2024-12-31 VITALS
HEIGHT: 67 IN | SYSTOLIC BLOOD PRESSURE: 136 MMHG | WEIGHT: 207.8 LBS | HEART RATE: 73 BPM | BODY MASS INDEX: 32.62 KG/M2 | OXYGEN SATURATION: 96 % | DIASTOLIC BLOOD PRESSURE: 80 MMHG

## 2024-12-31 DIAGNOSIS — E10.649 TYPE 1 DIABETES MELLITUS WITH HYPOGLYCEMIA AND WITHOUT COMA: Primary | ICD-10-CM

## 2024-12-31 PROCEDURE — 3079F DIAST BP 80-89 MM HG: CPT | Performed by: INTERNAL MEDICINE

## 2024-12-31 PROCEDURE — 3075F SYST BP GE 130 - 139MM HG: CPT | Performed by: INTERNAL MEDICINE

## 2024-12-31 PROCEDURE — 1160F RVW MEDS BY RX/DR IN RCRD: CPT | Performed by: INTERNAL MEDICINE

## 2024-12-31 PROCEDURE — 3051F HG A1C>EQUAL 7.0%<8.0%: CPT | Performed by: INTERNAL MEDICINE

## 2024-12-31 PROCEDURE — 99213 OFFICE O/P EST LOW 20 MIN: CPT | Performed by: INTERNAL MEDICINE

## 2024-12-31 PROCEDURE — 1159F MED LIST DOCD IN RCRD: CPT | Performed by: INTERNAL MEDICINE

## 2024-12-31 NOTE — ASSESSMENT & PLAN NOTE
Eyes- up to date   Ariana- 12/20/24  Lipids/- 12/ 20/ 24  Foot check in June     Assessment - A1c stable at 7.2  Plan : no changes

## 2024-12-31 NOTE — PROGRESS NOTES
"     Office Note      Date: 2024  Patient Name: Keo Kang  MRN: [id number]  : 1970    Chief Complaint   Patient presents with    Type 1 diabetes mellitus with hypoglycemia and without coma       History of Present Illness:   Keo Kang is a 54 y.o. male who presents for Diabetes type 1.   Current RX tresiba and novolog     Bg checks are done:with cgm  Hypoglycemia :rare   He is on statin and arb     Recent A1c was 7.2  Lipids were done    Ariana was done       Last A1c:  Hemoglobin A1C   Date Value Ref Range Status   2024 7.5 (A) 4.8 - 5.9 % Final   2023 7.3 (A) 4.5 - 5.7 % Final   2023 7.5  Final       Changes in health since last visit: none. Last eye exam up to date.    Subjective              Review of Systems:   Review of Systems   Endocrine: Negative for cold intolerance and heat intolerance.       The following portions of the patient's history were reviewed and updated as appropriate: allergies, current medications, past family history, past medical history, past social history, past surgical history, and problem list.    Objective     Visit Vitals  /80 (BP Location: Left arm, Patient Position: Sitting)   Pulse 73   Ht 170.2 cm (67\")   Wt 94.3 kg (207 lb 12.8 oz)   SpO2 96%   BMI 32.55 kg/m²           Physical Exam:  Physical Exam  Vitals reviewed.   Constitutional:       Appearance: Normal appearance.   Neurological:      Mental Status: He is alert.   Psychiatric:         Mood and Affect: Mood normal.         Behavior: Behavior normal.         Thought Content: Thought content normal.         Judgment: Judgment normal.          Assessment / Plan      Assessment & Plan:  Problem List Items Addressed This Visit       Type 1 diabetes mellitus with hypoglycemia - Primary    Current Assessment & Plan     Eyes- up to date   Ariana- 24  Lipids/-   Foot check in      Assessment - A1c stable at 7.2  Plan : no changes           Relevant " Medications    Tresiba FlexTouch 200 UNIT/ML solution pen-injector pen injection    NovoLOG FlexPen 100 UNIT/ML solution pen-injector sc pen    Accu-Chek SmartView test strip         Electronically signed by : Dante Lou MD  12/31/2024

## 2025-01-22 ENCOUNTER — PRIOR AUTHORIZATION (OUTPATIENT)
Dept: ENDOCRINOLOGY | Facility: CLINIC | Age: 55
End: 2025-01-22
Payer: MEDICARE

## 2025-01-22 NOTE — TELEPHONE ENCOUNTER
Keo Kang (Chin: [id number])  PA Case ID #: [id number]  Rx #: [id number]  Need Help? Call us at [phone]  Outcome  Approved today by [facility]  PA Case: [id number], Status: Approved, Coverage Starts on: 1/22/2025 12:00:00 AM, Coverage Ends on: 12/31/2025 12:00:00 AM. Questions? Contact [phone].  Authorization Expiration Date: 12/30/2025  Drug  NovoLOG FlexPen 100UNIT/ML pen-injectors  ePA cloud logo  Form  [employer] Form

## 2025-03-13 DIAGNOSIS — E10.649 TYPE 1 DIABETES MELLITUS WITH HYPOGLYCEMIA AND WITHOUT COMA: ICD-10-CM

## 2025-03-14 RX ORDER — BLOOD SUGAR DIAGNOSTIC
1 STRIP MISCELLANEOUS 4 TIMES DAILY
Qty: 400 EACH | Refills: 1 | Status: SHIPPED | OUTPATIENT
Start: 2025-03-14

## 2025-03-14 RX ORDER — LANCETS
1 EACH MISCELLANEOUS 4 TIMES DAILY
Qty: 400 EACH | Refills: 1 | Status: SHIPPED | OUTPATIENT
Start: 2025-03-14

## 2025-03-14 NOTE — TELEPHONE ENCOUNTER
Rx Refill Note  Requested Prescriptions     Signed Prescriptions Disp Refills    Accu-Chek SmartView test strip 400 each 1     Sig: USE TO TEST BLOOD SUGAR 4 TIMES DAILY     Authorizing Provider: FATOU HOANG     Ordering User: ALICE MIRELES    Accu-Chek FastClix Lancets misc 400 each 1     Sig: CHECK BLOOD SUGAR FOUR TIMES DAILY     Authorizing Provider: FATOU HOANG     Ordering User: ALICE MIRELES      Last office visit with prescribing clinician: 12/31/2024     Next office visit with prescribing clinician: 7/11/2025       Alice Mireles  03/14/25, 12:13 EDT

## 2025-05-15 RX ORDER — INSULIN DEGLUDEC 200 U/ML
INJECTION, SOLUTION SUBCUTANEOUS
Qty: 45 ML | Refills: 3 | Status: SHIPPED | OUTPATIENT
Start: 2025-05-15

## 2025-05-15 NOTE — TELEPHONE ENCOUNTER
Rx Refill Note  Requested Prescriptions     Pending Prescriptions Disp Refills    Tresiba FlexTouch 200 UNIT/ML solution pen-injector pen injection [Pharmacy Med Name: Tresiba FlexTouch Subcutaneous Solution Pen-injector 200 UNIT/ML] 45 mL 3     Sig: INJECT 98 UNITS UNDER THE SKIN INTO THE APPROPRIATE AREA AS DIRECTED DAILY      Last office visit with prescribing clinician: 12/31/2024      Next office visit with prescribing clinician: 7/11/2025       Bonita Larson MA  05/15/25, 09:52 EDT

## 2025-07-17 ENCOUNTER — OFFICE VISIT (OUTPATIENT)
Age: 55
End: 2025-07-17
Payer: MEDICARE

## 2025-07-17 VITALS
WEIGHT: 206 LBS | BODY MASS INDEX: 32.33 KG/M2 | DIASTOLIC BLOOD PRESSURE: 82 MMHG | SYSTOLIC BLOOD PRESSURE: 130 MMHG | OXYGEN SATURATION: 98 % | HEART RATE: 71 BPM | HEIGHT: 67 IN

## 2025-07-17 DIAGNOSIS — E10.649 TYPE 1 DIABETES MELLITUS WITH HYPOGLYCEMIA AND WITHOUT COMA: Primary | ICD-10-CM

## 2025-07-17 PROCEDURE — 1159F MED LIST DOCD IN RCRD: CPT | Performed by: INTERNAL MEDICINE

## 2025-07-17 PROCEDURE — 3075F SYST BP GE 130 - 139MM HG: CPT | Performed by: INTERNAL MEDICINE

## 2025-07-17 PROCEDURE — 99213 OFFICE O/P EST LOW 20 MIN: CPT | Performed by: INTERNAL MEDICINE

## 2025-07-17 PROCEDURE — 1160F RVW MEDS BY RX/DR IN RCRD: CPT | Performed by: INTERNAL MEDICINE

## 2025-07-17 PROCEDURE — 3079F DIAST BP 80-89 MM HG: CPT | Performed by: INTERNAL MEDICINE

## 2025-07-17 NOTE — PROGRESS NOTES
"     Office Note      Date: 2025  Patient Name: Keo Kang  MRN: [id number]  : 1970    Chief Complaint   Patient presents with    Diabetes       History of Present Illness:   Keo Kang is a 54 y.o. male who presents for Diabetes type 1.   Current RX bbi    Bg checks are done:uses sensor  Hypoglycemia :rare   Had one that was quite symptomatic     Last A1c:  Hemoglobin A1C   Date Value Ref Range Status   2024 7.5 (A) 4.8 - 5.9 % Final   2023 7.3 (A) 4.5 - 5.7 % Final   2023 7.5  Final       Changes in health since last visit: more LUTS . Last eye exam next week.    Subjective              Review of Systems:   Review of Systems   Genitourinary:  Positive for difficulty urinating.       The following portions of the patient's history were reviewed and updated as appropriate: allergies, current medications, past family history, past medical history, past social history, past surgical history, and problem list.    Objective     Visit Vitals  /82 (BP Location: Right arm, Patient Position: Sitting, Cuff Size: Adult)   Pulse 71   Ht 170.2 cm (67\")   Wt 93.4 kg (206 lb)   SpO2 98%   BMI 32.26 kg/m²           Physical Exam:  Physical Exam  Vitals reviewed.   Constitutional:       Appearance: Normal appearance.   Neurological:      Mental Status: He is alert.   Psychiatric:         Mood and Affect: Mood normal.         Behavior: Behavior normal.         Thought Content: Thought content normal.         Judgment: Judgment normal.          Assessment / Plan      Assessment & Plan:  Problem List Items Addressed This Visit       Type 1 diabetes mellitus with hypoglycemia - Primary    Current Assessment & Plan   Eyes- next week  Uman and egfr and lipids done last month  Feet are good  Assessment- A1c stble and good at 7  Plan : no changes         Relevant Medications    NovoLOG FlexPen 100 UNIT/ML solution pen-injector sc pen    Accu-Chek SmartView test strip    Tresiba FlexTouch 200 " UNIT/ML solution pen-injector pen injection         Electronically signed by : Dante Lou MD  07/17/2025

## 2025-07-17 NOTE — ASSESSMENT & PLAN NOTE
Eyes- next week  Uman and egfr and lipids done last month  Feet are good  Assessment- A1c stble and good at 7  Plan : no changes